# Patient Record
Sex: MALE | Race: OTHER | HISPANIC OR LATINO | Employment: UNEMPLOYED | URBAN - METROPOLITAN AREA
[De-identification: names, ages, dates, MRNs, and addresses within clinical notes are randomized per-mention and may not be internally consistent; named-entity substitution may affect disease eponyms.]

---

## 2023-08-31 ENCOUNTER — HOSPITAL ENCOUNTER (EMERGENCY)
Facility: HOSPITAL | Age: 23
Discharge: HOME | End: 2023-08-31
Attending: EMERGENCY MEDICINE

## 2023-08-31 VITALS
TEMPERATURE: 97.7 F | HEART RATE: 97 BPM | SYSTOLIC BLOOD PRESSURE: 148 MMHG | OXYGEN SATURATION: 98 % | RESPIRATION RATE: 16 BRPM | DIASTOLIC BLOOD PRESSURE: 79 MMHG

## 2023-08-31 DIAGNOSIS — S61.210A LACERATION OF RIGHT INDEX FINGER WITHOUT FOREIGN BODY WITHOUT DAMAGE TO NAIL, INITIAL ENCOUNTER: Primary | ICD-10-CM

## 2023-08-31 PROCEDURE — 12001 RPR S/N/AX/GEN/TRNK 2.5CM/<: CPT | Performed by: EMERGENCY MEDICINE

## 2023-08-31 PROCEDURE — 99284 EMERGENCY DEPT VISIT MOD MDM: CPT | Performed by: EMERGENCY MEDICINE

## 2023-08-31 PROCEDURE — 99283 EMERGENCY DEPT VISIT LOW MDM: CPT

## 2023-08-31 NOTE — Clinical Note
Stephany Sow was seen and treated in our emergency department on 8/31/2023. Diagnosis: finger laceration    Almita Brown  may return to work on return date. He may return on this date: 09/05/2023         If you have any questions or concerns, please don't hesitate to call.       Sue Perez DPM    ______________________________           _______________          _______________  Hospital Representative                              Date                                Time

## 2023-08-31 NOTE — DISCHARGE INSTRUCTIONS
Keep the finger laceration covered with a fresh bandaid each day. The surgical glue applied in the ED will fall off on its own in several days. If any redness, worsening pain, or increased drainage from the finger is noticed return to the ED.

## 2023-08-31 NOTE — ED PROVIDER NOTES
History  Chief Complaint   Patient presents with   • Finger Injury     Cut R pointer finger with a knife yesterday. Patient presents for evaluation of a right index finger laceration with his friend who assists with translation and providing history. He was using a knife in the kitchen cutting vegetables yesterday when he accidentally cut the tip of his index finger. He says it had been bleeding but he kept it covered with a bandage and it is controlled now. He reports the finger is very painful but he denies numbness or paresthesias. He denies nausea, vomiting, chills, shortness of breath, chest pain. History provided by:  Patient and friend   used: Patient's friend assists with translation. None       History reviewed. No pertinent past medical history. History reviewed. No pertinent surgical history. History reviewed. No pertinent family history. I have reviewed and agree with the history as documented. E-Cigarette/Vaping     E-Cigarette/Vaping Substances     Social History     Tobacco Use   • Smoking status: Never   • Smokeless tobacco: Never   Substance Use Topics   • Drug use: Never        Review of Systems   Constitutional: Negative for appetite change, chills, diaphoresis and fatigue. HENT: Negative. Eyes: Negative. Respiratory: Negative. Cardiovascular: Negative. Gastrointestinal: Negative. Endocrine: Negative. Genitourinary: Negative. Musculoskeletal: Negative. Skin: Positive for wound. Neurological: Negative. Psychiatric/Behavioral: Negative.         Physical Exam  ED Triage Vitals [08/31/23 0917]   Temperature Pulse Respirations Blood Pressure SpO2   97.7 °F (36.5 °C) 97 16 148/79 98 %      Temp Source Heart Rate Source Patient Position - Orthostatic VS BP Location FiO2 (%)   Oral Monitor Sitting Right arm --      Pain Score       5             Orthostatic Vital Signs  Vitals:    08/31/23 0917   BP: 148/79   Pulse: 97   Patient Position - Orthostatic VS: Sitting       Physical Exam  Vitals reviewed. Constitutional:       General: He is not in acute distress. Appearance: Normal appearance. He is not ill-appearing or diaphoretic. HENT:      Head: Normocephalic and atraumatic. Mouth/Throat:      Mouth: Mucous membranes are moist.   Eyes:      Pupils: Pupils are equal, round, and reactive to light. Cardiovascular:      Rate and Rhythm: Normal rate. Pulses: Normal pulses. Radial pulses are 2+ on the right side and 2+ on the left side. Pulmonary:      Effort: Pulmonary effort is normal. No respiratory distress. Abdominal:      General: Abdomen is flat. There is no distension. Musculoskeletal:         General: Tenderness present. No deformity. Cervical back: No rigidity. Comments: Patient able to flex and extend right index finger at the PIPJ, DIPJ, and MCPJ. Skin:     General: Skin is warm. Capillary Refill: Capillary refill takes less than 2 seconds. Coloration: Skin is not cyanotic, mottled or pale. Findings: Laceration present. No bruising or erythema. Comments: Laceration approximately 2 cm long on Right index finger, lateral to the fingernail and extending over the volar surface of the fingertip. No active bleeding. No gross contamination. No deep structures visualized. Neurological:      General: No focal deficit present. Mental Status: He is alert and oriented to person, place, and time. Sensory: No sensory deficit. Comments: Light touch sensation intact to distal right index finger tip. Psychiatric:         Mood and Affect: Mood normal.         Behavior: Behavior normal.         ED Medications  Medications - No data to display    Diagnostic Studies  Results Reviewed     None                 No orders to display         Procedures  Universal Protocol:  Consent: Verbal consent obtained.   Risks and benefits: risks, benefits and alternatives were discussed  Consent given by: patient  Patient understanding: patient states understanding of the procedure being performed  Patient identity confirmed: verbally with patient    Laceration repair    Date/Time: 8/31/2023 10:31 AM    Performed by: Ludmila Loja DPM  Authorized by: Ludmila Loja DPM  Body area: upper extremity  Location details: right index finger  Laceration length: 2 cm  Foreign bodies: no foreign bodies  Tendon involvement: none  Nerve involvement: none  Vascular damage: no      Procedure Details:  Irrigation solution: saline  Irrigation method: syringe  Amount of cleaning: standard  Degree of undermining: none  Skin closure: glue  Approximation difficulty: simple  Dressing: adhesive bandage. Patient tolerance: patient tolerated the procedure well with no immediate complications            ED Course  ED Course as of 08/31/23 1035   Thu Aug 31, 2023   1035 Patient presented for evaluation of a new right index finger laceration. Physical exam revealed no contamination, no deep structure involvement, and no active bleeding. It was decided that the laceration could be repaired with surgical glue and did not need sutures. Surgical glue and a bandage was applied after the laceration was thoroughly irrigated. Medical Decision Making  Patient presented for evaluation of a new right index finger laceration. Physical exam revealed no contamination, no deep structure involvement, and no active bleeding. It was decided that the laceration could be repaired with surgical glue and did not need sutures. Surgical glue and a bandage was applied after the laceration was thoroughly irrigated.     Laceration of right index finger without foreign body without damage to nail, initial encounter: acute illness or injury  Amount and/or Complexity of Data Reviewed  Independent Historian: friend     Details: Friend assisted with translation and providing history            Disposition  Final diagnoses:   Laceration of right index finger without foreign body without damage to nail, initial encounter     Time reflects when diagnosis was documented in both MDM as applicable and the Disposition within this note     Time User Action Codes Description Comment    8/31/2023 10:19 AM Jair Pierre Add [P72.148M] Laceration of right index finger without foreign body without damage to nail, initial encounter       ED Disposition     ED Disposition   Discharge    Condition   Stable    Date/Time   Thu Aug 31, 2023 10:18 AM    Comment   Negro Lawson discharge to home/self care. Follow-up Information    None         Patient's Medications    No medications on file     No discharge procedures on file. PDMP Review     None           ED Provider  Attending physically available and evaluated [facility]. I managed the patient along with the ED Attending.     Electronically Signed by         Jair Pierre DPM  08/31/23 5809

## 2024-10-04 ENCOUNTER — OCCMED (OUTPATIENT)
Dept: URGENT CARE | Age: 24
End: 2024-10-04
Payer: OTHER MISCELLANEOUS

## 2024-10-04 DIAGNOSIS — R10.32 GROIN DISCOMFORT, LEFT: Primary | ICD-10-CM

## 2024-10-04 PROCEDURE — 99283 EMERGENCY DEPT VISIT LOW MDM: CPT | Performed by: PHYSICIAN ASSISTANT

## 2024-10-04 PROCEDURE — G0382 LEV 3 HOSP TYPE B ED VISIT: HCPCS | Performed by: PHYSICIAN ASSISTANT

## 2024-10-28 ENCOUNTER — APPOINTMENT (OUTPATIENT)
Dept: URGENT CARE | Age: 24
End: 2024-10-28
Payer: OTHER MISCELLANEOUS

## 2024-10-28 PROCEDURE — 99213 OFFICE O/P EST LOW 20 MIN: CPT | Performed by: STUDENT IN AN ORGANIZED HEALTH CARE EDUCATION/TRAINING PROGRAM

## 2024-11-18 ENCOUNTER — ANESTHESIA EVENT (OUTPATIENT)
Dept: ANESTHESIOLOGY | Facility: HOSPITAL | Age: 24
End: 2024-11-18

## 2024-11-18 ENCOUNTER — ANESTHESIA (OUTPATIENT)
Dept: ANESTHESIOLOGY | Facility: HOSPITAL | Age: 24
End: 2024-11-18

## 2024-11-19 RX ORDER — MAGNESIUM L-LACTATE 84 MG
84 TABLET, EXTENDED RELEASE ORAL DAILY
COMMUNITY

## 2024-11-19 NOTE — PRE-PROCEDURE INSTRUCTIONS
Pre-Surgery Instructions:   Medication Instructions    magnesium (MAGTAB) 84 MG (7MEQ) TBCR Hold until after surgery     Medication instructions for day surgery reviewed. Please use only a sip of water to take your instructed medications. Avoid all over the counter vitamins, supplements and NSAIDS for one week prior to surgery per anesthesia guidelines. Tylenol is ok to take as needed.     You will receive a call one business day prior to surgery with an arrival time and hospital directions. If your surgery is scheduled on a Monday, the hospital will be calling you on the Friday prior to your surgery. If you have not heard from anyone by 8pm, please call the hospital supervisor through the hospital  at [phone]. ([place] [phone] or [place] [phone]).    Do not eat or drink anything after midnight the night before your surgery, including candy, mints, lifesavers, or chewing gum. Do not drink alcohol 24hrs before your surgery. Try not to smoke at least 24hrs before your surgery.       Follow the pre surgery showering instructions as listed in the “My Surgical Experience Booklet” or otherwise provided by your surgeon's office. Do not use a blade to shave the surgical area 1 week before surgery. It is okay to use a clean electric clippers up to 24 hours before surgery. Do not apply any lotions, creams, including makeup, cologne, deodorant, or perfumes after showering on the day of your surgery. Do not use dry shampoo, hair spray, hair gel, or any type of hair products.     No contact lenses, eye make-up, or artificial eyelashes. Remove nail polish, including gel polish, and any artificial, gel, or acrylic nails if possible. Remove all jewelry including rings and body piercing jewelry.     Wear causal clothing that is easy to take on and off. Consider your type of surgery.    Keep any valuables, jewelry, piercings at home. Please bring any specially ordered equipment (sling, braces) if  indicated.    Arrange for a responsible person to drive you to and from the hospital on the day of your surgery. Please confirm the visitor policy for the day of your procedure when you receive your phone call with an arrival time.     Call the surgeon's office with any new illnesses, exposures, or additional questions prior to surgery.    Please reference your “My Surgical Experience Booklet” for additional information to prepare for your upcoming surgery.

## 2024-11-22 ENCOUNTER — HOSPITAL ENCOUNTER (OUTPATIENT)
Facility: AMBULARY SURGERY CENTER | Age: 24
Setting detail: OUTPATIENT SURGERY
Discharge: HOME | End: 2024-11-22
Attending: SURGERY | Admitting: SURGERY
Payer: OTHER MISCELLANEOUS

## 2024-11-22 ENCOUNTER — ANESTHESIA EVENT (OUTPATIENT)
Dept: PERIOP | Facility: AMBULARY SURGERY CENTER | Age: 24
End: 2024-11-22
Payer: OTHER MISCELLANEOUS

## 2024-11-22 VITALS
WEIGHT: 175 LBS | DIASTOLIC BLOOD PRESSURE: 76 MMHG | TEMPERATURE: 99 F | HEIGHT: 70 IN | BODY MASS INDEX: 25.05 KG/M2 | SYSTOLIC BLOOD PRESSURE: 138 MMHG | HEART RATE: 110 BPM | OXYGEN SATURATION: 99 % | RESPIRATION RATE: 16 BRPM

## 2024-11-22 DIAGNOSIS — K40.90 UNILATERAL INGUINAL HERNIA, WITHOUT OBSTRUCTION OR GANGRENE, NOT SPECIFIED AS RECURRENT: ICD-10-CM

## 2024-11-22 PROCEDURE — C1781 MESH (IMPLANTABLE): HCPCS | Performed by: SURGERY

## 2024-11-22 PROCEDURE — 88302 TISSUE EXAM BY PATHOLOGIST: CPT | Performed by: SPECIALIST

## 2024-11-22 DEVICE — PERFIX PLUG, MEDIUM (CONTENTS: 6)
Type: IMPLANTABLE DEVICE | Site: INGUINAL | Status: FUNCTIONAL
Brand: PERFIX

## 2024-11-22 RX ORDER — CEFAZOLIN SODIUM 1 G/3ML
INJECTION, POWDER, FOR SOLUTION INTRAMUSCULAR; INTRAVENOUS AS NEEDED
Status: DISCONTINUED | OUTPATIENT
Start: 2024-11-22 | End: 2024-11-22

## 2024-11-22 RX ORDER — FENTANYL CITRATE 50 UG/ML
INJECTION, SOLUTION INTRAMUSCULAR; INTRAVENOUS AS NEEDED
Status: DISCONTINUED | OUTPATIENT
Start: 2024-11-22 | End: 2024-11-22

## 2024-11-22 RX ORDER — SODIUM CHLORIDE, SODIUM LACTATE, POTASSIUM CHLORIDE, CALCIUM CHLORIDE 600; 310; 30; 20 MG/100ML; MG/100ML; MG/100ML; MG/100ML
INJECTION, SOLUTION INTRAVENOUS CONTINUOUS PRN
Status: DISCONTINUED | OUTPATIENT
Start: 2024-11-22 | End: 2024-11-22

## 2024-11-22 RX ORDER — MAGNESIUM HYDROXIDE 1200 MG/15ML
LIQUID ORAL AS NEEDED
Status: DISCONTINUED | OUTPATIENT
Start: 2024-11-22 | End: 2024-11-22 | Stop reason: HOSPADM

## 2024-11-22 RX ORDER — PROPOFOL 10 MG/ML
INJECTION, EMULSION INTRAVENOUS AS NEEDED
Status: DISCONTINUED | OUTPATIENT
Start: 2024-11-22 | End: 2024-11-22

## 2024-11-22 RX ORDER — CEFAZOLIN SODIUM 2 G/50ML
2000 SOLUTION INTRAVENOUS ONCE
Status: DISCONTINUED | OUTPATIENT
Start: 2024-11-22 | End: 2024-11-22 | Stop reason: HOSPADM

## 2024-11-22 RX ORDER — ACETAMINOPHEN 325 MG/1
650 TABLET ORAL EVERY 6 HOURS PRN
Status: CANCELLED | OUTPATIENT
Start: 2024-11-22

## 2024-11-22 RX ORDER — FENTANYL CITRATE/PF 50 MCG/ML
25 SYRINGE (ML) INJECTION
Status: DISCONTINUED | OUTPATIENT
Start: 2024-11-22 | End: 2024-11-22 | Stop reason: HOSPADM

## 2024-11-22 RX ORDER — MIDAZOLAM HYDROCHLORIDE 2 MG/2ML
INJECTION, SOLUTION INTRAMUSCULAR; INTRAVENOUS AS NEEDED
Status: DISCONTINUED | OUTPATIENT
Start: 2024-11-22 | End: 2024-11-22

## 2024-11-22 RX ORDER — ONDANSETRON 2 MG/ML
4 INJECTION INTRAMUSCULAR; INTRAVENOUS ONCE AS NEEDED
Status: DISCONTINUED | OUTPATIENT
Start: 2024-11-22 | End: 2024-11-22 | Stop reason: HOSPADM

## 2024-11-22 RX ORDER — GINSENG 100 MG
CAPSULE ORAL AS NEEDED
Status: DISCONTINUED | OUTPATIENT
Start: 2024-11-22 | End: 2024-11-22 | Stop reason: HOSPADM

## 2024-11-22 RX ORDER — KETOROLAC TROMETHAMINE 10 MG/1
10 TABLET, FILM COATED ORAL EVERY 6 HOURS PRN
Qty: 10 TABLET | Refills: 0 | Status: SHIPPED | OUTPATIENT
Start: 2024-11-22 | End: 2024-11-27

## 2024-11-22 RX ORDER — LIDOCAINE HYDROCHLORIDE 20 MG/ML
INJECTION, SOLUTION EPIDURAL; INFILTRATION; INTRACAUDAL; PERINEURAL AS NEEDED
Status: DISCONTINUED | OUTPATIENT
Start: 2024-11-22 | End: 2024-11-22

## 2024-11-22 RX ORDER — ROCURONIUM BROMIDE 10 MG/ML
INJECTION, SOLUTION INTRAVENOUS AS NEEDED
Status: DISCONTINUED | OUTPATIENT
Start: 2024-11-22 | End: 2024-11-22

## 2024-11-22 RX ORDER — ACETAMINOPHEN 500 MG
500 TABLET ORAL EVERY 6 HOURS PRN
Start: 2024-11-22

## 2024-11-22 RX ORDER — HYDROMORPHONE HCL/PF 1 MG/ML
0.4 SYRINGE (ML) INJECTION
Status: DISCONTINUED | OUTPATIENT
Start: 2024-11-22 | End: 2024-11-22 | Stop reason: HOSPADM

## 2024-11-22 RX ORDER — KETOROLAC TROMETHAMINE 30 MG/ML
INJECTION, SOLUTION INTRAMUSCULAR; INTRAVENOUS AS NEEDED
Status: DISCONTINUED | OUTPATIENT
Start: 2024-11-22 | End: 2024-11-22

## 2024-11-22 RX ORDER — DEXAMETHASONE SODIUM PHOSPHATE 10 MG/ML
INJECTION, SOLUTION INTRAMUSCULAR; INTRAVENOUS AS NEEDED
Status: DISCONTINUED | OUTPATIENT
Start: 2024-11-22 | End: 2024-11-22

## 2024-11-22 RX ORDER — ONDANSETRON 2 MG/ML
INJECTION INTRAMUSCULAR; INTRAVENOUS AS NEEDED
Status: DISCONTINUED | OUTPATIENT
Start: 2024-11-22 | End: 2024-11-22

## 2024-11-22 RX ADMIN — ROCURONIUM BROMIDE 50 MG: 10 INJECTION, SOLUTION INTRAVENOUS at 10:44

## 2024-11-22 RX ADMIN — KETOROLAC TROMETHAMINE 15 MG: 30 INJECTION, SOLUTION INTRAMUSCULAR; INTRAVENOUS at 10:39

## 2024-11-22 RX ADMIN — MIDAZOLAM HYDROCHLORIDE 2 MG: 2 INJECTION, SOLUTION INTRAMUSCULAR; INTRAVENOUS at 10:39

## 2024-11-22 RX ADMIN — FENTANYL CITRATE 100 MCG: 50 INJECTION, SOLUTION INTRAMUSCULAR; INTRAVENOUS at 10:44

## 2024-11-22 RX ADMIN — ONDANSETRON 4 MG: 2 INJECTION INTRAMUSCULAR; INTRAVENOUS at 10:47

## 2024-11-22 RX ADMIN — PROPOFOL 200 MG: 10 INJECTION, EMULSION INTRAVENOUS at 10:44

## 2024-11-22 RX ADMIN — CEFAZOLIN SODIUM 2000 MG: 1 INJECTION, POWDER, FOR SOLUTION INTRAMUSCULAR; INTRAVENOUS at 10:50

## 2024-11-22 RX ADMIN — LIDOCAINE HYDROCHLORIDE 100 MG: 20 INJECTION, SOLUTION EPIDURAL; INFILTRATION; INTRACAUDAL; PERINEURAL at 10:44

## 2024-11-22 RX ADMIN — DEXAMETHASONE SODIUM PHOSPHATE 10 MG: 10 INJECTION, SOLUTION INTRAMUSCULAR; INTRAVENOUS at 10:45

## 2024-11-22 RX ADMIN — SODIUM CHLORIDE, SODIUM LACTATE, POTASSIUM CHLORIDE, CALCIUM CHLORIDE: 600; 310; 30; 20 INJECTION, SOLUTION INTRAVENOUS at 10:34

## 2024-11-22 NOTE — INTERVAL H&P NOTE
H&P reviewed. After examining the patient I find no changes in the patients condition since the H&P had been written.    Vitals:    11/22/24 0908   BP: 157/88   Pulse: 94   Resp: 20   Temp: 99.2 °F (37.3 °C)

## 2024-11-22 NOTE — ANESTHESIA PREPROCEDURE EVALUATION
Procedure:  REDUCTION AND REPAIR OF INCARCERATED INGUINAL HERNIA (Left: Groin)    Relevant Problems   No relevant active problems        Physical Exam    Airway    Mallampati score: II  TM Distance: >3 FB  Neck ROM: full     Dental   No notable dental hx     Cardiovascular  Rhythm: regular, Rate: normal, No weak pulses    Pulmonary   No stridor    Other Findings        Anesthesia Plan  ASA Score- 1     Anesthesia Type- general with ASA Monitors.         Additional Monitors:     Airway Plan: ETT.           Plan Factors-    Chart reviewed.   Existing labs reviewed. Patient summary reviewed.                  Induction- intravenous.    Postoperative Plan-     Perioperative Resuscitation Plan - Level 1 - Full Code.       Informed Consent- Anesthetic plan and risks discussed with patient.  I personally reviewed this patient with the CRNA. Discussed and agreed on the Anesthesia Plan with the CRNA..

## 2024-11-22 NOTE — ANESTHESIA POSTPROCEDURE EVALUATION
Post-Op Assessment Note    CV Status:  Stable  Pain Score: 0    Pain management: adequate       Mental Status:  Awake and alert   Hydration Status:  Stable   PONV Controlled:  None   Airway Patency:  Patent and adequate     Post Op Vitals Reviewed: Yes    No anethesia notable event occurred.    Staff: CRNA, Anesthesiologist           Last Filed PACU Vitals:  Vitals Value Taken Time   Temp 98.7    Pulse 85    /56    Resp 16    SpO2 98        Modified Eddie:  No data recorded

## 2024-11-26 NOTE — ANESTHESIA POSTPROCEDURE EVALUATION
Post-Op Assessment Note    CV Status:  Stable  Pain Score: 3    Pain management: adequate       Mental Status:  Alert and awake   Hydration Status:  Euvolemic   PONV Controlled:  Controlled   Airway Patency:  Patent     Post Op Vitals Reviewed: Yes    No anethesia notable event occurred.    Staff: Anesthesiologist           Last Filed PACU Vitals:  Vitals Value Taken Time   Temp 98.6 °F (37 °C) 11/22/24 1229   Pulse 102 11/22/24 1232   /74 11/22/24 1232   Resp 20 11/22/24 1232   SpO2 99 % 11/22/24 1232   Vitals shown include unfiled device data.    Modified Eddie:  No data recorded

## 2024-11-27 PROCEDURE — 88302 TISSUE EXAM BY PATHOLOGIST: CPT | Performed by: SPECIALIST

## 2024-11-28 ENCOUNTER — HOSPITAL ENCOUNTER (EMERGENCY)
Facility: HOSPITAL | Age: 24
Discharge: HOME | End: 2024-11-28
Attending: EMERGENCY MEDICINE

## 2024-11-28 ENCOUNTER — APPOINTMENT (EMERGENCY)
Dept: ULTRASOUND IMAGING | Facility: HOSPITAL | Age: 24
End: 2024-11-28

## 2024-11-28 ENCOUNTER — APPOINTMENT (EMERGENCY)
Dept: CT IMAGING | Facility: HOSPITAL | Age: 24
End: 2024-11-28

## 2024-11-28 VITALS
DIASTOLIC BLOOD PRESSURE: 64 MMHG | HEART RATE: 86 BPM | WEIGHT: 176.15 LBS | RESPIRATION RATE: 18 BRPM | OXYGEN SATURATION: 97 % | TEMPERATURE: 98.3 F | SYSTOLIC BLOOD PRESSURE: 132 MMHG | BODY MASS INDEX: 25.27 KG/M2

## 2024-11-28 DIAGNOSIS — Z98.890 S/P INGUINAL HERNIA REPAIR: ICD-10-CM

## 2024-11-28 DIAGNOSIS — Z87.19 S/P INGUINAL HERNIA REPAIR: ICD-10-CM

## 2024-11-28 DIAGNOSIS — N50.82 SCROTAL PAIN: Primary | ICD-10-CM

## 2024-11-28 LAB
ANION GAP SERPL CALCULATED.3IONS-SCNC: 7 MMOL/L (ref 4–13)
BASOPHILS # BLD AUTO: 0.04 THOUSANDS/ΜL (ref 0–0.1)
BASOPHILS NFR BLD AUTO: 0 % (ref 0–1)
BUN SERPL-MCNC: 13 MG/DL (ref 5–25)
CALCIUM SERPL-MCNC: 9.5 MG/DL (ref 8.4–10.2)
CHLORIDE SERPL-SCNC: 103 MMOL/L (ref 96–108)
CO2 SERPL-SCNC: 27 MMOL/L (ref 21–32)
CREAT SERPL-MCNC: 1.28 MG/DL (ref 0.6–1.3)
EOSINOPHIL # BLD AUTO: 0.14 THOUSAND/ΜL (ref 0–0.61)
EOSINOPHIL NFR BLD AUTO: 1 % (ref 0–6)
ERYTHROCYTE [DISTWIDTH] IN BLOOD BY AUTOMATED COUNT: 12.1 % (ref 11.6–15.1)
GFR SERPL CREATININE-BSD FRML MDRD: 78 ML/MIN/1.73SQ M
GLUCOSE SERPL-MCNC: 126 MG/DL (ref 65–140)
HCT VFR BLD AUTO: 44.6 % (ref 36.5–49.3)
HGB BLD-MCNC: 15.8 G/DL (ref 12–17)
IMM GRANULOCYTES # BLD AUTO: 0.04 THOUSAND/UL (ref 0–0.2)
IMM GRANULOCYTES NFR BLD AUTO: 0 % (ref 0–2)
LYMPHOCYTES # BLD AUTO: 1.81 THOUSANDS/ΜL (ref 0.6–4.47)
LYMPHOCYTES NFR BLD AUTO: 13 % (ref 14–44)
MCH RBC QN AUTO: 29.8 PG (ref 26.8–34.3)
MCHC RBC AUTO-ENTMCNC: 35.4 G/DL (ref 31.4–37.4)
MCV RBC AUTO: 84 FL (ref 82–98)
MONOCYTES # BLD AUTO: 1.22 THOUSAND/ΜL (ref 0.17–1.22)
MONOCYTES NFR BLD AUTO: 9 % (ref 4–12)
NEUTROPHILS # BLD AUTO: 11.01 THOUSANDS/ΜL (ref 1.85–7.62)
NEUTS SEG NFR BLD AUTO: 77 % (ref 43–75)
NRBC BLD AUTO-RTO: 0 /100 WBCS
PLATELET # BLD AUTO: 226 THOUSANDS/UL (ref 149–390)
PMV BLD AUTO: 10 FL (ref 8.9–12.7)
POTASSIUM SERPL-SCNC: 4 MMOL/L (ref 3.5–5.3)
RBC # BLD AUTO: 5.3 MILLION/UL (ref 3.88–5.62)
SODIUM SERPL-SCNC: 137 MMOL/L (ref 135–147)
WBC # BLD AUTO: 14.26 THOUSAND/UL (ref 4.31–10.16)

## 2024-11-28 PROCEDURE — 76870 US EXAM SCROTUM: CPT

## 2024-11-28 PROCEDURE — 96374 THER/PROPH/DIAG INJ IV PUSH: CPT

## 2024-11-28 PROCEDURE — 96375 TX/PRO/DX INJ NEW DRUG ADDON: CPT

## 2024-11-28 PROCEDURE — 99024 POSTOP FOLLOW-UP VISIT: CPT | Performed by: SURGERY

## 2024-11-28 PROCEDURE — 99283 EMERGENCY DEPT VISIT LOW MDM: CPT

## 2024-11-28 PROCEDURE — 99285 EMERGENCY DEPT VISIT HI MDM: CPT | Performed by: EMERGENCY MEDICINE

## 2024-11-28 PROCEDURE — 80048 BASIC METABOLIC PNL TOTAL CA: CPT | Performed by: EMERGENCY MEDICINE

## 2024-11-28 PROCEDURE — 74177 CT ABD & PELVIS W/CONTRAST: CPT

## 2024-11-28 PROCEDURE — 85025 COMPLETE CBC W/AUTO DIFF WBC: CPT | Performed by: EMERGENCY MEDICINE

## 2024-11-28 PROCEDURE — 36415 COLL VENOUS BLD VENIPUNCTURE: CPT | Performed by: EMERGENCY MEDICINE

## 2024-11-28 RX ORDER — OXYCODONE HYDROCHLORIDE 5 MG/1
5 TABLET ORAL EVERY 4 HOURS PRN
Qty: 4 TABLET | Refills: 0 | Status: SHIPPED | OUTPATIENT
Start: 2024-11-28 | End: 2024-11-28 | Stop reason: CLARIF

## 2024-11-28 RX ORDER — OXYCODONE HYDROCHLORIDE 5 MG/1
5 TABLET ORAL ONCE
Refills: 0 | Status: COMPLETED | OUTPATIENT
Start: 2024-11-28 | End: 2024-11-28

## 2024-11-28 RX ORDER — MORPHINE SULFATE 4 MG/ML
4 INJECTION, SOLUTION INTRAMUSCULAR; INTRAVENOUS ONCE
Status: COMPLETED | OUTPATIENT
Start: 2024-11-28 | End: 2024-11-28

## 2024-11-28 RX ORDER — HYDROMORPHONE HCL/PF 1 MG/ML
0.5 SYRINGE (ML) INJECTION ONCE
Status: COMPLETED | OUTPATIENT
Start: 2024-11-28 | End: 2024-11-28

## 2024-11-28 RX ORDER — OXYCODONE HYDROCHLORIDE 5 MG/1
5 TABLET ORAL EVERY 4 HOURS PRN
Qty: 4 TABLET | Refills: 0 | Status: SHIPPED | OUTPATIENT
Start: 2024-11-28

## 2024-11-28 RX ADMIN — OXYCODONE 5 MG: 5 TABLET ORAL at 21:50

## 2024-11-28 RX ADMIN — IOHEXOL 100 ML: 350 INJECTION, SOLUTION INTRAVENOUS at 17:31

## 2024-11-28 RX ADMIN — MORPHINE SULFATE 4 MG: 4 INJECTION INTRAVENOUS at 17:04

## 2024-11-28 RX ADMIN — HYDROMORPHONE HYDROCHLORIDE 0.5 MG: 1 INJECTION, SOLUTION INTRAMUSCULAR; INTRAVENOUS; SUBCUTANEOUS at 17:47

## 2024-11-28 NOTE — CONSULTS
Consultation - Surgery-General   Name: Panchito Mak 24 y.o. male I MRN: [id number]  Unit/Bed#: ED-31 I Date of Admission: 11/28/2024   Date of Service: 11/28/2024 I Hospital Day: 0   Consult to surgery general  Consult performed by: Facundo Galvan MD  Consult ordered by: Penny Arredondo DO        Physician Requesting Evaluation: Ion Peters MD   Reason for Evaluation / Principal Problem: L scrotal pain s/p LIHR 11/22    Assessment & Plan  S/P left inguinal hernia repair  23yo M with no PMHx s/p LIHR with plug d/t incarcerated omentum presenting with increased L scrotal pain and swelling.  CT and US revealing fluid collection and inflammatory changes causing mass effect of L spermatic cord. Likely seroma/hematoma rather than infectious process.    Plan  - No drainage or aspiration indicated at this time  - patient instructed to schedule a follow up visit with his surgeon ASAP  - instructed on strict return precautions: worsening pain or swelling, fevers.  - Prn pain control, ice and elevated affected area      History of Present Illness   Panchito Mak is a 24 y.o. male with no past medical history who presents with increasing left scrotal pain and swelling status post left inguinal hernia repair due to incarcerated omentum on 11/22.  Found to have 4 cm defect which was repaired with plug.  The inguinal floor was found to be healthy and intact therefore no mesh was used.  His pain has been persistent since surgery but the swelling developed and worsened as of yesterday. Patient otherwise reports tolerating diet without nausea or vomiting.  Having bowel function and voiding spontaneously without issue.  Patient has not yet made a follow-up appointment for his postop check. He denies participating in strenuous activity, denies smoking.    Review of Systems  I have reviewed the patient's PMH, PSH, Social History, Family History, Meds, and Allergies    Objective :  Temp:  [98.3 °F (36.8 °C)] 98.3  °F (36.8 °C)  HR:  [] 86  BP: (132-166)/(64-81) 132/64  Resp:  [18] 18  SpO2:  [97 %-98 %] 97 %  O2 Device: None (Room air)      Physical Exam  General: NAD  HENT: NCAT MMM  Neck: supple, no JVD  CV: nl rate  Lungs: nl wob. No resp distress  ABD: Soft, nontender, nondistended  : Scrotal swelling pronounced on L side, TTP L testicle and L inguinal canal without color changes. Alleviated with elevation.  Extrem: No CCE  Neuro: AAOx3      Lab Results: I have reviewed the following results:  Recent Labs     11/28/24  1702   WBC 14.26*   HGB 15.8   HCT 44.6      SODIUM 137   K 4.0      CO2 27   BUN 13   CREATININE 1.28   GLUC 126             VTE Pharmacologic Prophylaxis: VTE covered by:    None      VTE Mechanical Prophylaxis: sequential compression device

## 2024-11-28 NOTE — ED PROVIDER NOTES
Time reflects when diagnosis was documented in both MDM as applicable and the Disposition within this note       Time User Action Codes Description Comment    11/28/2024  6:16 PM Penny Arredondo Add [N50.82] Scrotal pain     11/28/2024  6:16 PM Penny Arredondo Add [Z98.890,  Z87.19] S/P inguinal hernia repair     11/28/2024  6:16 PM Penny Arredondo Modify [Z98.890,  Z87.19] S/P inguinal hernia repair left          ED Disposition       None          Assessment & Plan       Medical Decision Making  A 25 yo male presents with worsening left scrotum pain and swelling, s/p inguinal hernia repair on 11/22.  Will check labs to evaluate for anemia, electrolyte abnormality and RONNI.  Will check CTAP to evaluate for postop complications including hematoma, recurrent hernia and obstruction.  Will provide pain meds.    Amount and/or Complexity of Data Reviewed  Labs: ordered. Decision-making details documented in ED Course.  Radiology: ordered. Decision-making details documented in ED Course.    Risk  Prescription drug management.        ED Course as of 11/28/24 1825   Thu Nov 28, 2024 1707 WBC(!): 14.26   1804 CT abdomen pelvis with contrast  Prominent left scrotum with small amount of scrotal fluid and some wall thickening. Inflammatory changes seen extending into the inguinal canal. Tiny air pockets in the inguinal canal.  Scrotal ultrasound recommended.    Will proceed with scrotal US.  Will also discuss with general surgery.   1815 Surgery to see in consult   1821 Pt updated on results, reports to feeling better after dilaudid.   1823 Pt signed out to Dr. Peters, pending scrotal US and surgical consult.  Dispo accordingly.       Medications   morphine injection 4 mg (4 mg Intravenous Given 11/28/24 1704)   iohexol (OMNIPAQUE) 350 MG/ML injection (MULTI-DOSE) 100 mL (100 mL Intravenous Given 11/28/24 1731)   HYDROmorphone (DILAUDID) injection 0.5 mg (0.5 mg Intravenous Given 11/28/24 1747)       ED Risk Strat Scores                            SBIRT 22yo+      Flowsheet Row Most Recent Value   Initial Alcohol Screen: US AUDIT-C     1. How often do you have a drink containing alcohol? 0 Filed at: 11/28/2024 1646   2. How many drinks containing alcohol do you have on a typical day you are drinking?  0 Filed at: 11/28/2024 1646   3a. Male UNDER 65: How often do you have five or more drinks on one occasion? 0 Filed at: 11/28/2024 1646   Audit-C Score 0 Filed at: 11/28/2024 1646   JAXON: How many times in the past year have you...    Used an illegal drug or used a prescription medication for non-medical reasons? Never Filed at: 11/28/2024 1646                            History of Present Illness       Chief Complaint   Patient presents with    Post-op Problem     Pt states last Friday having L inguinal hernia repair. States scrotum has remained swollen but began w/ pain yesterday. Thought pain would go away but became worse today. Called surgery today but office was closed       History reviewed. No pertinent past medical history.   Past Surgical History:   Procedure Laterality Date    NO PAST SURGERIES      AK RPR 1ST INGUN HRNA AGE 7 YRS/> REDUCIBLE Left 11/22/2024    Procedure: REDUCTION AND REPAIR OF INCARCERATED INDIRECT INGUINAL HERNIA WITH PLUG, 4cm DEFECT;  Surgeon: CHLOE Xie MD;  Location: [facility];  Service: General      History reviewed. No pertinent family history.   Social History     Tobacco Use    Smoking status: Never    Smokeless tobacco: Never   Substance Use Topics    Alcohol use: Yes    Drug use: Never      E-Cigarette/Vaping      E-Cigarette/Vaping Substances      I have reviewed and agree with the history as documented.     A 23 yo male who is s/p repair of incarcerated left inguinal hernia on 11/22 presents with increased pain and swelling to his left scrotum.  Pt states he has had ongoing swelling since the surgery, however yesterday the swelling significantly worsened with onset of pain.  Pt denies  abdominal pain, nausea and vomiting.  He reports a normal bowel movement yesterday, passing gas today.  Pt has been taking tylenol for the pain with minimal relief.  Pt otherwise denies fever, chills, chest pain, SOB, dysuria, peripheral edema and rashes.      History provided by:  Patient, medical records and parent   used: Yes        Review of Systems   Genitourinary:  Positive for scrotal swelling.   All other systems reviewed and are negative.      Objective       ED Triage Vitals   Temperature Pulse Blood Pressure Respirations SpO2 Patient Position - Orthostatic VS   11/28/24 1649 11/28/24 1649 11/28/24 1649 11/28/24 1649 11/28/24 1649 11/28/24 1649   98.3 °F (36.8 °C) (!) 106 138/81 18 98 % Lying      Temp Source Heart Rate Source BP Location FiO2 (%) Pain Score    11/28/24 1649 11/28/24 1649 11/28/24 1649 -- 11/28/24 1747    Oral Monitor Right arm  8      Vitals      Date and Time Temp Pulse SpO2 Resp BP Pain Score FACES Pain Rating User   11/28/24 1747 -- -- -- -- -- 8 --    11/28/24 1715 -- 104 98 % 18 166/79 -- --    11/28/24 1649 98.3 °F (36.8 °C) 106 98 % 18 138/81 -- -- DR            Physical Exam  General Appearance: alert and oriented, appears uncomfortable  Skin:  Warm, dry, intact.  No cyanosis  HEENT: Atraumatic, normocephalic.  No eye drainage.  Normal hearing.  Moist mucous membranes.    Neck: Supple, trachea midline  Cardiac: RRR; no murmurs, rub, gallops.  No pedal edema, 2+ pulses  Pulmonary: lungs CTAB; no wheezes, rales, rhonchi  Gastrointestinal: abdomen soft, nontender, nondistended; no guarding or rebound tenderness; good bowel sounds, no mass or bruits. Incision to left groin C/D/I.  Genitourinary: Chaperoned by MOUSTAPHA Sommer and ANMOL Ambriz.  Significant swelling, ecchymosis and tenderness of the left scrotum.    Extremities:  No deformities.  No calf tenderness, no clubbing  Neuro:  no focal motor or sensory deficits, CN 2-12 grossly intact  Psych:  Normal mood and  affect, normal judgement and insight       Results Reviewed       Procedure Component Value Units Date/Time    Basic metabolic panel [[id number]] Collected: 11/28/24 1702    Lab Status: Final result Specimen: Blood from Arm, Left Updated: 11/28/24 1721     Sodium 137 mmol/L      Potassium 4.0 mmol/L      Chloride 103 mmol/L      CO2 27 mmol/L      ANION GAP 7 mmol/L      BUN 13 mg/dL      Creatinine 1.28 mg/dL      Glucose 126 mg/dL      Calcium 9.5 mg/dL      eGFR 78 ml/min/1.73sq m     Narrative:      National Kidney Disease Foundation guidelines for Chronic Kidney Disease (CKD):     Stage 1 with normal or high GFR (GFR > 90 mL/min/1.73 square meters)    Stage 2 Mild CKD (GFR = 60-89 mL/min/1.73 square meters)    Stage 3A Moderate CKD (GFR = 45-59 mL/min/1.73 square meters)    Stage 3B Moderate CKD (GFR = 30-44 mL/min/1.73 square meters)    Stage 4 Severe CKD (GFR = 15-29 mL/min/1.73 square meters)    Stage 5 End Stage CKD (GFR <15 mL/min/1.73 square meters)  Note: GFR calculation is accurate only with a steady state creatinine    CBC and differential [[id number]]  (Abnormal) Collected: 11/28/24 1702    Lab Status: Final result Specimen: Blood from Arm, Left Updated: 11/28/24 1707     WBC 14.26 Thousand/uL      RBC 5.30 Million/uL      Hemoglobin 15.8 g/dL      Hematocrit 44.6 %      MCV 84 fL      MCH 29.8 pg      MCHC 35.4 g/dL      RDW 12.1 %      MPV 10.0 fL      Platelets 226 Thousands/uL      nRBC 0 /100 WBCs      Segmented % 77 %      Immature Grans % 0 %      Lymphocytes % 13 %      Monocytes % 9 %      Eosinophils Relative 1 %      Basophils Relative 0 %      Absolute Neutrophils 11.01 Thousands/µL      Absolute Immature Grans 0.04 Thousand/uL      Absolute Lymphocytes 1.81 Thousands/µL      Absolute Monocytes 1.22 Thousand/µL      Eosinophils Absolute 0.14 Thousand/µL      Basophils Absolute 0.04 Thousands/µL             CT abdomen pelvis with contrast   Final Interpretation by Rosales Zhao MD  (11/28 1801)      Prominent left scrotum with small amount of scrotal fluid and some wall thickening. Inflammatory changes seen extending into the inguinal canal. Tiny air pockets in the inguinal canal.   Scrotal ultrasound recommended.      The study was marked in EPIC for immediate notification.         Workstation performed: [id number]         US scrotum and testicles    (Results Pending)       Procedures    ED Medication and Procedure Management   Prior to Admission Medications   Prescriptions Last Dose Informant Patient Reported? Taking?   acetaminophen (TYLENOL) 500 mg tablet   No No   Sig: Take 1 tablet (500 mg total) by mouth every 6 (six) hours as needed for mild pain   ketorolac (TORADOL) 10 mg tablet   No No   Sig: Take 1 tablet (10 mg total) by mouth every 6 (six) hours as needed for moderate pain for up to 5 days   magnesium (MAGTAB) 84 MG (7MEQ) TBCR   Yes No   Sig: Take 84 mg by mouth daily      Facility-Administered Medications: None     Patient's Medications   Discharge Prescriptions    No medications on file     No discharge procedures on file.  ED SEPSIS DOCUMENTATION   Time reflects when diagnosis was documented in both MDM as applicable and the Disposition within this note       Time User Action Codes Description Comment    11/28/2024  6:16 PM Penny Arredondo Add [N50.82] Scrotal pain     11/28/2024  6:16 PM Penny Arredondo Add [Z98.890,  Z87.19] S/P inguinal hernia repair     11/28/2024  6:16 PM Penny Arredondo Modify [Z98.890,  Z87.19] S/P inguinal hernia repair left                 Penny Arredondo DO  11/28/24 182

## 2024-11-29 NOTE — ED CARE HANDOFF
Emergency Department Sign Out Note        Sign out and transfer of care from Dr. Arredondo. See Separate Emergency Department note.     The patient, Panchito Mak, was evaluated by the previous provider for Left scrotum swelling/pain S/P hernia surgery.    Workup Completed:  Labs, CT, general surgery consultation    ED Course / Workup Pending (followup):  U/S        2015 - U/S shows fluid collection in the scrotum with what appears to be mass effect over the spermatic cord but with a normal appearing testicle. Likely seroma vs hematoma. Recommending surgical/urologic evaluation. Did discuss with [facility] who requests message be sent out to  Dr. Xie, who performed the surgery. States, if he would like they can attempt aspiration, otherwise would likely be best to follow up in the office closely. Did sent message to Dr. Xie, however listed as busy.    2109 - Discussed again with surgery, case has been discussed with the attending. Ok with discharge for close follow up with Dr. Xie. Dr. Xie continues to be unavailable. Did send another message to let him know the patient would be instructed to follow up closely. Will discharge patient.                           Procedures  MDM        Disposition  Final diagnoses:   Scrotal pain   S/P inguinal hernia repair - left     Time reflects when diagnosis was documented in both MDM as applicable and the Disposition within this note       Time User Action Codes Description Comment    11/28/2024  6:16 PM Penny Arredondo Add [N50.82] Scrotal pain     11/28/2024  6:16 PM Penny Arredondo Add [Z98.890,  Z87.19] S/P inguinal hernia repair     11/28/2024  6:16 PM Penny Arredondo Modify [Z98.890,  Z87.19] S/P inguinal hernia repair left          ED Disposition       None          Follow-up Information    None       Patient's Medications   Discharge Prescriptions    No medications on file     No discharge procedures on file.       ED  Provider  Electronically Signed by     Ion Peters MD  11/29/24 9841

## 2024-11-29 NOTE — ASSESSMENT & PLAN NOTE
25yo M with no PMHx s/p LIHR with plug d/t incarcerated omentum presenting with increased L scrotal pain and swelling.  CT and US revealing fluid collection and inflammatory changes causing mass effect of L spermatic cord. Likely seroma/hematoma rather than infectious process.    Plan  - No drainage or aspiration indicated at this time  - patient instructed to schedule a follow up visit with his surgeon ASAP  - instructed on strict return precautions: worsening pain or swelling, fevers.  - Prn pain control, ice and elevated affected area

## 2024-11-29 NOTE — DISCHARGE INSTRUCTIONS
A prescription for pain pills has been sent to the Pharmacy, you can take 1 every 6 hours. This will make you drowsy so do not drive while taking it.    Call the surgeon in the morning, a message has been sent as well but you should call the office to be seen as soon as possible.    Return to the ER if your pain is not being controlled while taking the medication or if you develop fevers.     Se ha enviado ambrocio receta para analgésicos a la farmacia, puede pamela zacarias cada 6 horas.     [place] le producirá somnolencia, así que no conduzca mientras lo esté tomando. Llame al cirujano por la mañana, también se ha enviado un mensaje, kwasi debe llamar al consultorio para que lo atiendan lo antes posible.     Regrese a la chema de emergencias si no controla el dolor mientras marcelle el medicamento o si presenta fiebre.

## (undated) DEVICE — ANTIBACTERIAL UNDYED BRAIDED (POLYGLACTIN 910), SYNTHETIC ABSORBABLE SUTURE: Brand: COATED VICRYL

## (undated) DEVICE — GLOVE SRG BIOGEL 7.5

## (undated) DEVICE — GAUZE SPONGES,16 PLY: Brand: CURITY

## (undated) DEVICE — GLOVE INDICATOR PI UNDERGLOVE SZ 7.5 BLUE

## (undated) DEVICE — ROSEBUD DISSECTORS: Brand: DEROYAL

## (undated) DEVICE — 3M™ TEGADERM™ TRANSPARENT FILM DRESSING FRAME STYLE, 1627, 4 IN X 10 IN (10 CM X 25 CM), 20/CT 4CT/CASE: Brand: 3M™ TEGADERM™

## (undated) DEVICE — PENCIL ELECTROSURG E-Z CLEAN -0035H

## (undated) DEVICE — SUT MONOCRYL 4-0 PS-2 27 IN Y426H

## (undated) DEVICE — ELECTRODE BLADE MOD E-Z CLEAN  2.75IN 7CM -0012AM

## (undated) DEVICE — INTENDED FOR TISSUE SEPARATION, AND OTHER PROCEDURES THAT REQUIRE A SHARP SURGICAL BLADE TO PUNCTURE OR CUT.: Brand: BARD-PARKER SAFETY BLADES SIZE 15, STERILE

## (undated) DEVICE — PENROSE DRAIN, 18 X 3 8: Brand: CARDINAL HEALTH

## (undated) DEVICE — BETHLEHEM UNIVERSAL MINOR GEN: Brand: CARDINAL HEALTH

## (undated) DEVICE — EXOFIN PRECISION PEN HIGH VISCOSITY TOPICAL SKIN ADHESIVE: Brand: EXOFIN PRECISION PEN, 1G

## (undated) DEVICE — SUT VICRYL PLUS 2-0 54IN VCP286G

## (undated) DEVICE — CHLORAPREP HI-LITE 26ML ORANGE